# Patient Record
Sex: FEMALE | NOT HISPANIC OR LATINO | URBAN - METROPOLITAN AREA
[De-identification: names, ages, dates, MRNs, and addresses within clinical notes are randomized per-mention and may not be internally consistent; named-entity substitution may affect disease eponyms.]

---

## 2024-06-10 ENCOUNTER — APPOINTMENT (RX ONLY)
Dept: URBAN - METROPOLITAN AREA CLINIC 43 | Facility: CLINIC | Age: 29
Setting detail: DERMATOLOGY
End: 2024-06-10

## 2024-06-10 DIAGNOSIS — L50.1 IDIOPATHIC URTICARIA: ICD-10-CM

## 2024-06-10 DIAGNOSIS — L70.0 ACNE VULGARIS: ICD-10-CM

## 2024-06-10 PROCEDURE — 99203 OFFICE O/P NEW LOW 30 MIN: CPT

## 2024-06-10 PROCEDURE — ? PRESCRIPTION

## 2024-06-10 PROCEDURE — ? COUNSELING

## 2024-06-10 PROCEDURE — ? TREATMENT REGIMEN

## 2024-06-10 RX ORDER — TRETIONIN 0.25 MG/G
CREAM TOPICAL
Qty: 45 | Refills: 4 | Status: ERX | COMMUNITY
Start: 2024-06-10

## 2024-06-10 RX ADMIN — TRETIONIN: 0.25 CREAM TOPICAL at 00:00

## 2024-06-10 NOTE — PROCEDURE: TREATMENT REGIMEN
Plan: Acute urticaria, started 2 weeks ago and is now clear. Hives are clear today, she completed prednisone yesterday (needed 60mg), still taking 4 Zyrtec tabs once daily and Pepcid. \\nShe had recent viral illness, which is most likely trigger. Allergy testing (blood) was negative. \\n\\n- We discussed most common causes of acute urticaria. \\n- Recommend she slowly decrease her Zyrtec over the new few weeks.\\n- If hives recur, it is OK to take up to 4 antihistamines per day (Zyrtec or Allegra). Will consider prednisone course if needed. \\n- She will call if hives recur or worsen. \\n- Plan to check in with her in 2 weeks. \\nRecheck as needed.
Detail Level: Zone

## 2024-06-10 NOTE — HPI: RASH
What Type Of Note Output Would You Prefer (Optional)?: Standard Output
Is The Patient Presenting As Previously Scheduled?: Yes
How Severe Is Your Rash?: moderate
Is This A New Presentation, Or A Follow-Up?: Referral for Rash
Who Is Your Referring Provider?: [facility]